# Patient Record
Sex: FEMALE | URBAN - METROPOLITAN AREA
[De-identification: names, ages, dates, MRNs, and addresses within clinical notes are randomized per-mention and may not be internally consistent; named-entity substitution may affect disease eponyms.]

---

## 2020-11-09 ENCOUNTER — IMPORTED ENCOUNTER (OUTPATIENT)
Dept: URBAN - METROPOLITAN AREA CLINIC 1 | Facility: CLINIC | Age: 25
End: 2020-11-09

## 2020-11-09 PROBLEM — H52.221: Noted: 2020-11-09

## 2020-11-09 PROBLEM — H52.13: Noted: 2020-11-09

## 2020-11-09 PROCEDURE — 92014 COMPRE OPH EXAM EST PT 1/>: CPT

## 2020-11-09 PROCEDURE — 92015 DETERMINE REFRACTIVE STATE: CPT

## 2020-11-09 NOTE — PATIENT DISCUSSION
1. Myopia: Rx was given for correction if indicated and requested. 2. Astigmatism [facility] for an appointment in 1 yr 36 with Dr. Heike Leung.

## 2022-04-02 ASSESSMENT — TONOMETRY
OS_IOP_MMHG: 16
OD_IOP_MMHG: 14

## 2022-04-02 ASSESSMENT — VISUAL ACUITY
OD_SC: 20/30
OD_CC: J1
OS_CC: J1
OS_SC: 20/25